# Patient Record
Sex: FEMALE | HISPANIC OR LATINO | URBAN - METROPOLITAN AREA
[De-identification: names, ages, dates, MRNs, and addresses within clinical notes are randomized per-mention and may not be internally consistent; named-entity substitution may affect disease eponyms.]

---

## 2022-06-24 ENCOUNTER — OFFICE VISIT (OUTPATIENT)
Dept: URBAN - METROPOLITAN AREA CLINIC 82 | Facility: CLINIC | Age: 47
End: 2022-06-24
Payer: SELF-PAY

## 2022-06-24 ENCOUNTER — DASHBOARD ENCOUNTERS (OUTPATIENT)
Age: 47
End: 2022-06-24

## 2022-06-24 VITALS
HEIGHT: 65 IN | TEMPERATURE: 97.9 F | HEART RATE: 80 BPM | SYSTOLIC BLOOD PRESSURE: 114 MMHG | DIASTOLIC BLOOD PRESSURE: 78 MMHG | WEIGHT: 205 LBS | BODY MASS INDEX: 34.16 KG/M2

## 2022-06-24 DIAGNOSIS — R10.11 RIGHT UPPER QUADRANT ABDOMINAL PAIN: ICD-10-CM

## 2022-06-24 DIAGNOSIS — K59.00 CONSTIPATION, UNSPECIFIED CONSTIPATION TYPE: ICD-10-CM

## 2022-06-24 DIAGNOSIS — R93.2 ABNORMAL ULTRASOUND OF LIVER: ICD-10-CM

## 2022-06-24 DIAGNOSIS — Z90.49 HX LAPAROSCOPIC CHOLECYSTECTOMY: ICD-10-CM

## 2022-06-24 DIAGNOSIS — E66.9 OBESITY (BMI 30.0-34.9): ICD-10-CM

## 2022-06-24 PROBLEM — 443371000124107: Status: ACTIVE | Noted: 2022-06-24

## 2022-06-24 PROBLEM — 428882003: Status: ACTIVE | Noted: 2022-06-24

## 2022-06-24 PROBLEM — 14760008: Status: ACTIVE | Noted: 2022-06-24

## 2022-06-24 PROBLEM — 301717006: Status: ACTIVE | Noted: 2022-06-24

## 2022-06-24 PROBLEM — 15633881000119102: Status: ACTIVE | Noted: 2022-06-24

## 2022-06-24 PROCEDURE — 99202 OFFICE O/P NEW SF 15 MIN: CPT | Performed by: INTERNAL MEDICINE

## 2022-06-24 NOTE — HPI-TODAY'S VISIT:
45 y/o  female  patient with obesity,Hypothyroidism,external hemorrhoids, gallstones s/p Lap chris c/b retain stone s/p ERCP with stent at [facility] on  that was refer here due to chonic intermittent generalized abdominal pain w/o weight loss,Nausea,black stools or diarrhea.Refer constipation and some blood in the paper PCP diagnsed her with hemorrhoids,no recent bleeding. Recent labs from PCP including CMP,GGT,lipase are WNL. U/S shows expected CBD 5mm after surgery w/o sludge of more stones. it also shows steatosis. She denies alcohol use or family hx of liver disease or colon cancer.no smoke.1